# Patient Record
Sex: MALE | Race: OTHER | Employment: FULL TIME | URBAN - METROPOLITAN AREA
[De-identification: names, ages, dates, MRNs, and addresses within clinical notes are randomized per-mention and may not be internally consistent; named-entity substitution may affect disease eponyms.]

---

## 2017-05-19 PROBLEM — J30.89 ENVIRONMENTAL AND SEASONAL ALLERGIES: Status: ACTIVE | Noted: 2017-05-19

## 2020-10-01 ENCOUNTER — APPOINTMENT (OUTPATIENT)
Dept: GENERAL RADIOLOGY | Age: 35
End: 2020-10-01
Attending: EMERGENCY MEDICINE
Payer: COMMERCIAL

## 2020-10-01 ENCOUNTER — HOSPITAL ENCOUNTER (EMERGENCY)
Age: 35
Discharge: HOME | End: 2020-10-01
Attending: EMERGENCY MEDICINE
Payer: COMMERCIAL

## 2020-10-01 VITALS
BODY MASS INDEX: 31.93 KG/M2 | TEMPERATURE: 98.4 F | SYSTOLIC BLOOD PRESSURE: 162 MMHG | HEIGHT: 68 IN | DIASTOLIC BLOOD PRESSURE: 95 MMHG | OXYGEN SATURATION: 98 % | RESPIRATION RATE: 17 BRPM | HEART RATE: 87 BPM

## 2020-10-01 DIAGNOSIS — J20.8 VIRAL BRONCHITIS: Primary | ICD-10-CM

## 2020-10-01 PROCEDURE — 71046 X-RAY EXAM CHEST 2 VIEWS: CPT

## 2020-10-01 PROCEDURE — 99283 EMERGENCY DEPT VISIT LOW MDM: CPT

## 2020-10-01 PROCEDURE — 87635 SARS-COV-2 COVID-19 AMP PRB: CPT

## 2020-10-01 NOTE — DISCHARGE INSTRUCTIONS
Patient Education        Bronchitis: Care Instructions  Your Care Instructions     Bronchitis is inflammation of the bronchial tubes, which carry air to the lungs. The tubes swell and produce mucus, or phlegm. The mucus and inflamed bronchial tubes make you cough. You may have trouble breathing. Most cases of bronchitis are caused by viruses like those that cause colds. Antibiotics usually do not help and they may be harmful. Bronchitis usually develops rapidly and lasts about 2 to 3 weeks in otherwise healthy people. Follow-up care is a key part of your treatment and safety. Be sure to make and go to all appointments, and call your doctor if you are having problems. It's also a good idea to know your test results and keep a list of the medicines you take. How can you care for yourself at home? · Take all medicines exactly as prescribed. Call your doctor if you think you are having a problem with your medicine. · Get some extra rest.  · Take an over-the-counter pain medicine, such as acetaminophen (Tylenol), ibuprofen (Advil, Motrin), or naproxen (Aleve) to reduce fever and relieve body aches. Read and follow all instructions on the label. · Do not take two or more pain medicines at the same time unless the doctor told you to. Many pain medicines have acetaminophen, which is Tylenol. Too much acetaminophen (Tylenol) can be harmful. · Take an over-the-counter cough medicine that contains dextromethorphan to help quiet a dry, hacking cough so that you can sleep. Avoid cough medicines that have more than one active ingredient. Read and follow all instructions on the label. · Breathe moist air from a humidifier, hot shower, or sink filled with hot water. The heat and moisture will thin mucus so you can cough it out. · Do not smoke. Smoking can make bronchitis worse. If you need help quitting, talk to your doctor about stop-smoking programs and medicines.  These can increase your chances of quitting for good.  When should you call for help? Call 911 anytime you think you may need emergency care. For example, call if:    · You have severe trouble breathing. Call your doctor now or seek immediate medical care if:    · You have new or worse trouble breathing.     · You cough up dark brown or bloody mucus (sputum).     · You have a new or higher fever.     · You have a new rash. Watch closely for changes in your health, and be sure to contact your doctor if:    · You cough more deeply or more often, especially if you notice more mucus or a change in the color of your mucus.     · You are not getting better as expected. Where can you learn more? Go to http://www.gray.com/  Enter H333 in the search box to learn more about \"Bronchitis: Care Instructions. \"  Current as of: February 24, 2020               Content Version: 12.6  © 3297-2962 [employer]. Care instructions adapted under license by [employer] (which disclaims liability or warranty for this information). If you have questions about a medical condition or this instruction, always ask your healthcare professional. [address] any warranty or liability for your use of this information.

## 2020-10-01 NOTE — ED TRIAGE NOTES
Patient ambulated into triage without difficulty. Wearing face mask. Patient c/o fever of 100.3F oral, and cough that began yesterday. Reports slight sinus drainage. Denies exposure to anyone with Covid. A+O x4.

## 2020-10-01 NOTE — ED NOTES
I have reviewed discharge instructions with the patient. The patient verbalized understanding. Patient left ED via Discharge Method: ambulatory to Home with (insert name of family/friend, self, transport SELF). Opportunity for questions and clarification provided. Patient given 0 scripts. To continue your aftercare when you leave the hospital, you may receive an automated call from our care team to check in on how you are doing.  This is a free service and part of our promise to provide the best care and service to meet your aftercare needs. \" If you have questions, or wish to unsubscribe from this service please call [phone].  Thank you for Choosing our [facility] Emergency Department.
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
## 2020-10-02 ENCOUNTER — PATIENT OUTREACH (OUTPATIENT)
Dept: CASE MANAGEMENT | Age: 35
End: 2020-10-02

## 2020-10-02 LAB
SARS COV-2, XPGCVT: POSITIVE
SOURCE, COVRS: ABNORMAL

## 2020-10-02 NOTE — PROGRESS NOTES
1st Attempt to contact patient for TRISTON Initial Covid 19 call, no answer on patient's  number and unable to L/M on V/M. Message left on girlfriend's number requesting a return call.  . Will attempt to contact patient again within 24 hours

## 2020-10-05 ENCOUNTER — PATIENT OUTREACH (OUTPATIENT)
Dept: CASE MANAGEMENT | Age: 35
End: 2020-10-05

## 2020-10-05 NOTE — PROGRESS NOTES
2nd attempt to contact patient for ELIEZER CALDWELL Initial Covid 19  call, no answer, on patient's mobile number and unable to LM on V/M. Message left on girlfriend's number requesting a return call  Will attempt 3rd call within 2 business days.

## 2020-10-05 NOTE — PROGRESS NOTES
10/05/20 1st attempt to notify patient of positive Covid 19 result; no voicemail set up; unable to leave message

## 2020-10-09 ENCOUNTER — PATIENT OUTREACH (OUTPATIENT)
Dept: CASE MANAGEMENT | Age: 35
End: 2020-10-09

## 2020-10-09 NOTE — PROGRESS NOTES
3rd attempt to contact patient for MARTINS Victor Covid 19 Call. No answer, on mobile number and unable to L/M  Episode will be closed at this time as [address] has been unsuccessful in reaching the patient.  Will remove myself from the care team.

## 2021-12-31 ENCOUNTER — HOSPITAL ENCOUNTER (EMERGENCY)
Age: 36
Discharge: HOME | End: 2021-12-31
Attending: EMERGENCY MEDICINE
Payer: COMMERCIAL

## 2021-12-31 VITALS
HEIGHT: 68 IN | SYSTOLIC BLOOD PRESSURE: 145 MMHG | HEART RATE: 88 BPM | TEMPERATURE: 99.3 F | DIASTOLIC BLOOD PRESSURE: 95 MMHG | RESPIRATION RATE: 16 BRPM | BODY MASS INDEX: 31.37 KG/M2 | WEIGHT: 207 LBS | OXYGEN SATURATION: 98 %

## 2021-12-31 DIAGNOSIS — R73.9 HYPERGLYCEMIA: Primary | ICD-10-CM

## 2021-12-31 LAB
ALBUMIN SERPL-MCNC: 3.6 G/DL (ref 3.5–5)
ALBUMIN/GLOB SERPL: 0.9 {RATIO} (ref 1.2–3.5)
ALP SERPL-CCNC: 107 U/L (ref 50–136)
ALT SERPL-CCNC: 128 U/L (ref 12–65)
ANION GAP SERPL CALC-SCNC: 11 MMOL/L (ref 7–16)
AST SERPL-CCNC: 93 U/L (ref 15–37)
BACTERIA URNS QL MICRO: 0 /HPF
BASOPHILS # BLD: 0.1 K/UL (ref 0–0.2)
BASOPHILS NFR BLD: 1 % (ref 0–2)
BILIRUB SERPL-MCNC: 0.9 MG/DL (ref 0.2–1.1)
BUN SERPL-MCNC: 11 MG/DL (ref 6–23)
CALCIUM SERPL-MCNC: 9.7 MG/DL (ref 8.3–10.4)
CASTS URNS QL MICRO: NORMAL /LPF
CHLORIDE SERPL-SCNC: 97 MMOL/L (ref 98–107)
CO2 SERPL-SCNC: 26 MMOL/L (ref 21–32)
CREAT SERPL-MCNC: 0.99 MG/DL (ref 0.8–1.5)
DIFFERENTIAL METHOD BLD: ABNORMAL
EOSINOPHIL # BLD: 0.1 K/UL (ref 0–0.8)
EOSINOPHIL NFR BLD: 2 % (ref 0.5–7.8)
EPI CELLS #/AREA URNS HPF: 0 /HPF
ERYTHROCYTE [DISTWIDTH] IN BLOOD BY AUTOMATED COUNT: 12 % (ref 11.9–14.6)
GLOBULIN SER CALC-MCNC: 4.2 G/DL (ref 2.3–3.5)
GLUCOSE BLD STRIP.AUTO-MCNC: 238 MG/DL (ref 65–100)
GLUCOSE BLD STRIP.AUTO-MCNC: 305 MG/DL (ref 65–100)
GLUCOSE BLD STRIP.AUTO-MCNC: 507 MG/DL (ref 65–100)
GLUCOSE SERPL-MCNC: 480 MG/DL (ref 65–100)
HCT VFR BLD AUTO: 46.3 % (ref 41.1–50.3)
HGB BLD-MCNC: 16.9 G/DL (ref 13.6–17.2)
IMM GRANULOCYTES # BLD AUTO: 0 K/UL (ref 0–0.5)
IMM GRANULOCYTES NFR BLD AUTO: 0 % (ref 0–5)
LYMPHOCYTES # BLD: 2.3 K/UL (ref 0.5–4.6)
LYMPHOCYTES NFR BLD: 30 % (ref 13–44)
MCH RBC QN AUTO: 34.5 PG (ref 26.1–32.9)
MCHC RBC AUTO-ENTMCNC: 36.5 G/DL (ref 31.4–35)
MCV RBC AUTO: 94.5 FL (ref 79.6–97.8)
MONOCYTES # BLD: 0.5 K/UL (ref 0.1–1.3)
MONOCYTES NFR BLD: 6 % (ref 4–12)
NEUTS SEG # BLD: 4.7 K/UL (ref 1.7–8.2)
NEUTS SEG NFR BLD: 61 % (ref 43–78)
NRBC # BLD: 0 K/UL (ref 0–0.2)
PLATELET # BLD AUTO: 187 K/UL (ref 150–450)
PMV BLD AUTO: 12.1 FL (ref 9.4–12.3)
POTASSIUM SERPL-SCNC: 4.1 MMOL/L (ref 3.5–5.1)
PROT SERPL-MCNC: 7.8 G/DL (ref 6.3–8.2)
RBC # BLD AUTO: 4.9 M/UL (ref 4.23–5.6)
RBC #/AREA URNS HPF: 0 /HPF
SERVICE CMNT-IMP: ABNORMAL
SODIUM SERPL-SCNC: 134 MMOL/L (ref 138–145)
WBC # BLD AUTO: 7.6 K/UL (ref 4.3–11.1)
WBC URNS QL MICRO: 0 /HPF

## 2021-12-31 PROCEDURE — 81015 MICROSCOPIC EXAM OF URINE: CPT

## 2021-12-31 PROCEDURE — 96374 THER/PROPH/DIAG INJ IV PUSH: CPT

## 2021-12-31 PROCEDURE — 74011636637 HC RX REV CODE- 636/637: Performed by: EMERGENCY MEDICINE

## 2021-12-31 PROCEDURE — 81003 URINALYSIS AUTO W/O SCOPE: CPT

## 2021-12-31 PROCEDURE — 80053 COMPREHEN METABOLIC PANEL: CPT

## 2021-12-31 PROCEDURE — 74011250636 HC RX REV CODE- 250/636: Performed by: EMERGENCY MEDICINE

## 2021-12-31 PROCEDURE — 85025 COMPLETE CBC W/AUTO DIFF WBC: CPT

## 2021-12-31 PROCEDURE — 82962 GLUCOSE BLOOD TEST: CPT

## 2021-12-31 PROCEDURE — 96361 HYDRATE IV INFUSION ADD-ON: CPT

## 2021-12-31 PROCEDURE — 99284 EMERGENCY DEPT VISIT MOD MDM: CPT

## 2021-12-31 RX ORDER — SODIUM CHLORIDE 0.9 % (FLUSH) 0.9 %
5-10 SYRINGE (ML) INJECTION AS NEEDED
Status: DISCONTINUED | OUTPATIENT
Start: 2021-12-31 | End: 2021-12-31 | Stop reason: HOSPADM

## 2021-12-31 RX ORDER — SODIUM CHLORIDE 0.9 % (FLUSH) 0.9 %
5-10 SYRINGE (ML) INJECTION EVERY 8 HOURS
Status: DISCONTINUED | OUTPATIENT
Start: 2021-12-31 | End: 2021-12-31 | Stop reason: HOSPADM

## 2021-12-31 RX ADMIN — Medication 10 ML: at 16:24

## 2021-12-31 RX ADMIN — INSULIN HUMAN 8 UNITS: 100 INJECTION, SOLUTION PARENTERAL at 16:21

## 2021-12-31 RX ADMIN — SODIUM CHLORIDE 1000 ML: 900 INJECTION, SOLUTION INTRAVENOUS at 10:49
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
## 2021-12-31 NOTE — ED NOTES
I have reviewed discharge instructions with the patient. The patient verbalized understanding. Patient left ED via Discharge Method: ambulatory to Home with spouse. Opportunity for questions and clarification provided. Patient given 0 scripts. No e-sign. To continue your aftercare when you leave the hospital, you may receive an automated call from our care team to check in on how you are doing. This is a free service and part of our promise to provide the best care and service to meet your aftercare needs.  If you have questions, or wish to unsubscribe from this service please call [phone]. Thank you for Choosing our [facility] Emergency Department.

## 2021-12-31 NOTE — ED TRIAGE NOTES
Pt arrives ambulatory to triage. Pt reports he had bloodwork done yesterday and was told his blood sugar is high. Pt was prescribed metformin, farxiga, and gemfibriozil. Has not started meds. No other complaints. NAD. Masked.

## 2022-01-03 LAB
BILIRUB UR QL: ABNORMAL
GLUCOSE UR QL STRIP.AUTO: >1000 MG/DL
KETONES UR-MCNC: >160 MG/DL
LEUKOCYTE ESTERASE UR QL STRIP: NEGATIVE
NITRITE UR QL: NEGATIVE
PH UR: 5 [PH] (ref 5–9)
PROT UR QL: 30 MG/DL
RBC # UR STRIP: ABNORMAL /UL
SERVICE CMNT-IMP: ABNORMAL
SP GR UR: 1.02 (ref 1–1.02)
UROBILINOGEN UR QL: 0.2 EU/DL (ref 0.2–1)

## 2022-03-19 PROBLEM — J30.89 ENVIRONMENTAL AND SEASONAL ALLERGIES: Status: ACTIVE | Noted: 2017-05-19

## 2023-03-04 ENCOUNTER — HOSPITAL ENCOUNTER (EMERGENCY)
Age: 38
Discharge: HOME | End: 2023-03-04
Attending: EMERGENCY MEDICINE
Payer: COMMERCIAL

## 2023-03-04 VITALS
BODY MASS INDEX: 34.99 KG/M2 | WEIGHT: 210 LBS | HEIGHT: 65 IN | TEMPERATURE: 98.2 F | HEART RATE: 67 BPM | RESPIRATION RATE: 18 BRPM | OXYGEN SATURATION: 97 % | SYSTOLIC BLOOD PRESSURE: 146 MMHG | DIASTOLIC BLOOD PRESSURE: 91 MMHG

## 2023-03-04 DIAGNOSIS — R51.9 FACIAL PAIN: ICD-10-CM

## 2023-03-04 DIAGNOSIS — R29.810 FACIAL WEAKNESS: Primary | ICD-10-CM

## 2023-03-04 PROCEDURE — 6360000002 HC RX W HCPCS: Performed by: EMERGENCY MEDICINE

## 2023-03-04 PROCEDURE — 96372 THER/PROPH/DIAG INJ SC/IM: CPT

## 2023-03-04 PROCEDURE — 99284 EMERGENCY DEPT VISIT MOD MDM: CPT

## 2023-03-04 RX ORDER — GABAPENTIN 100 MG/1
CAPSULE ORAL
COMMUNITY
Start: 2023-02-28 | End: 2023-03-04 | Stop reason: ALTCHOICE

## 2023-03-04 RX ORDER — AMOXICILLIN AND CLAVULANATE POTASSIUM 875; 125 MG/1; MG/1
TABLET, FILM COATED ORAL
COMMUNITY
Start: 2023-02-28

## 2023-03-04 RX ORDER — GABAPENTIN 300 MG/1
300 CAPSULE ORAL 3 TIMES DAILY
Qty: 45 CAPSULE | Refills: 1 | Status: SHIPPED | OUTPATIENT
Start: 2023-03-04 | End: 2023-03-19

## 2023-03-04 RX ORDER — LISINOPRIL 10 MG/1
TABLET ORAL
COMMUNITY

## 2023-03-04 RX ORDER — HYDROCODONE BITARTRATE AND ACETAMINOPHEN 5; 325 MG/1; MG/1
TABLET ORAL
COMMUNITY
Start: 2023-02-28

## 2023-03-04 RX ORDER — NAPROXEN 500 MG/1
500 TABLET ORAL 2 TIMES DAILY WITH MEALS
Qty: 40 TABLET | Refills: 0 | Status: SHIPPED | OUTPATIENT
Start: 2023-03-04

## 2023-03-04 RX ORDER — GEMFIBROZIL 600 MG/1
TABLET, FILM COATED ORAL
COMMUNITY

## 2023-03-04 RX ORDER — KETOROLAC TROMETHAMINE 30 MG/ML
30 INJECTION, SOLUTION INTRAMUSCULAR; INTRAVENOUS
Status: COMPLETED | OUTPATIENT
Start: 2023-03-04 | End: 2023-03-04

## 2023-03-04 RX ADMIN — KETOROLAC TROMETHAMINE 30 MG: 30 INJECTION, SOLUTION INTRAMUSCULAR at 11:09

## 2023-03-04 ASSESSMENT — PAIN DESCRIPTION - FREQUENCY: FREQUENCY: CONTINUOUS

## 2023-03-04 ASSESSMENT — PAIN DESCRIPTION - LOCATION: LOCATION: HEAD

## 2023-03-04 ASSESSMENT — PAIN SCALES - GENERAL: PAINLEVEL_OUTOF10: 10

## 2023-03-04 NOTE — ED TRIAGE NOTES
Pt reports being evaluated two weeks ago and diagnosed with bells palsy. Pt states he has been having headache, left side facial drooping, and pain to left side of face since. Reports the headache being intermittent until yesterday when he began being constant. Denies any changes in speech, vision, sensation, or movement.

## 2023-03-04 NOTE — DISCHARGE INSTRUCTIONS
May use lidocaine patch to neck area  Naprosyn with food   Neurontin three times daily  IF any worsening or continued  symptoms may need further work up including possible MRI or as per follow up provider

## 2023-03-07 ENCOUNTER — HOSPITAL ENCOUNTER (OUTPATIENT)
Dept: MRI IMAGING | Age: 38
Discharge: HOME | End: 2023-03-10
Payer: COMMERCIAL

## 2023-03-07 DIAGNOSIS — R51.9 NONINTRACTABLE HEADACHE, UNSPECIFIED CHRONICITY PATTERN, UNSPECIFIED HEADACHE TYPE: ICD-10-CM

## 2023-03-07 DIAGNOSIS — I10 ESSENTIAL HYPERTENSION, MALIGNANT: ICD-10-CM

## 2023-03-07 DIAGNOSIS — G51.0 BELL'S PALSY: ICD-10-CM

## 2023-03-07 PROCEDURE — 6360000004 HC RX CONTRAST MEDICATION

## 2023-03-07 PROCEDURE — 2580000003 HC RX 258

## 2023-03-07 PROCEDURE — 70553 MRI BRAIN STEM W/O & W/DYE: CPT

## 2023-03-07 PROCEDURE — A9579 GAD-BASE MR CONTRAST NOS,1ML: HCPCS

## 2023-03-07 RX ORDER — SODIUM CHLORIDE 0.9 % (FLUSH) 0.9 %
10 SYRINGE (ML) INJECTION AS NEEDED
Status: DISCONTINUED | OUTPATIENT
Start: 2023-03-07 | End: 2023-03-11 | Stop reason: HOSPADM

## 2023-03-07 RX ADMIN — SODIUM CHLORIDE, PRESERVATIVE FREE 10 ML: 5 INJECTION INTRAVENOUS at 10:59

## 2023-03-07 RX ADMIN — GADOTERIDOL 19 ML: 279.3 INJECTION, SOLUTION INTRAVENOUS at 10:58
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
